# Patient Record
Sex: FEMALE | Race: WHITE | URBAN - METROPOLITAN AREA
[De-identification: names, ages, dates, MRNs, and addresses within clinical notes are randomized per-mention and may not be internally consistent; named-entity substitution may affect disease eponyms.]

---

## 2024-06-26 ENCOUNTER — APPOINTMENT (RX ONLY)
Dept: URBAN - METROPOLITAN AREA CLINIC 146 | Facility: CLINIC | Age: 74
Setting detail: DERMATOLOGY
End: 2024-06-26

## 2024-06-26 DIAGNOSIS — Z42.1 ENCOUNTER FOR BREAST RECONSTRUCTION FOLLOWING MASTECTOMY: ICD-10-CM

## 2024-06-26 PROCEDURE — 99203 OFFICE O/P NEW LOW 30 MIN: CPT

## 2024-06-26 PROCEDURE — ? COUNSELING - BREAST CANCER

## 2024-06-26 PROCEDURE — ? COUNSELING -  BREAST RECONSTRUCTION

## 2024-06-26 NOTE — PROCEDURE: COUNSELING -  BREAST RECONSTRUCTION
Count Minor/Major Decisions Toward Mdm (Not Cosmetic)?: Yes
Allergan Gel Implant Size(S) - Left: 200, 250
Detail Level: Simple

## 2024-06-26 NOTE — HPI: BREAST RECONSTRUCTION CONSULTATION
What Is Your Pre-Diagnosis Bra Size (Numeric)?: 38
Is This A New Presentation, Or A Follow-Up?: Breast Reconstruction Consultation
Who Referred You?: Dr. Vee
Additional History: She did not receive chemotherapy or radiation

## 2024-09-17 ENCOUNTER — RX ONLY (OUTPATIENT)
Age: 74
Setting detail: RX ONLY
End: 2024-09-17

## 2024-09-17 RX ORDER — ONDANSETRON 8 MG/1
TABLET, ORALLY DISINTEGRATING ORAL
Qty: 8 | Refills: 0 | Status: ERX | COMMUNITY
Start: 2024-09-17

## 2024-09-17 RX ORDER — CEPHALEXIN 500 MG/1
CAPSULE ORAL
Qty: 42 | Refills: 0 | Status: ERX | COMMUNITY
Start: 2024-09-17

## 2024-09-17 RX ORDER — OXYCODONE HYDROCHLORIDE AND ACETAMINOPHEN 5; 325 MG/1; MG/1
TABLET ORAL
Qty: 18 | Refills: 0 | Status: ERX | COMMUNITY
Start: 2024-09-17

## 2024-09-26 ENCOUNTER — APPOINTMENT (RX ONLY)
Dept: URBAN - METROPOLITAN AREA CLINIC 146 | Facility: CLINIC | Age: 74
Setting detail: DERMATOLOGY
End: 2024-09-26

## 2024-09-26 DIAGNOSIS — Z48.89 ENCOUNTER FOR OTHER SPECIFIED SURGICAL AFTERCARE: ICD-10-CM

## 2024-09-26 PROCEDURE — 99024 POSTOP FOLLOW-UP VISIT: CPT

## 2024-09-26 PROCEDURE — ? POST-OP CHECK

## 2024-09-26 PROCEDURE — ? ADDITIONAL NOTES

## 2024-09-26 ASSESSMENT — LOCATION ZONE DERM: LOCATION ZONE: TRUNK

## 2024-09-26 ASSESSMENT — LOCATION DETAILED DESCRIPTION DERM: LOCATION DETAILED: LEFT MEDIAL BREAST 6-7:00 REGION

## 2024-09-26 ASSESSMENT — LOCATION SIMPLE DESCRIPTION DERM: LOCATION SIMPLE: LEFT BREAST

## 2024-09-26 NOTE — PROCEDURE: ADDITIONAL NOTES
Additional Notes: Patient is healing well. No hematomas. She has No complaints. \\nDrain tubing is clear. Continue antibiotics. \\nFollow up in one week.
Render Risk Assessment In Note?: no

## 2024-10-03 ENCOUNTER — APPOINTMENT (RX ONLY)
Dept: URBAN - METROPOLITAN AREA CLINIC 146 | Facility: CLINIC | Age: 74
Setting detail: DERMATOLOGY
End: 2024-10-03

## 2024-10-03 DIAGNOSIS — Z48.89 ENCOUNTER FOR OTHER SPECIFIED SURGICAL AFTERCARE: ICD-10-CM

## 2024-10-03 PROCEDURE — ? ADDITIONAL NOTES

## 2024-10-03 PROCEDURE — ? DRAIN REMOVAL

## 2024-10-03 PROCEDURE — 99024 POSTOP FOLLOW-UP VISIT: CPT

## 2024-10-03 PROCEDURE — ? POST-OP CHECK

## 2024-10-03 ASSESSMENT — LOCATION ZONE DERM: LOCATION ZONE: TRUNK

## 2024-10-03 ASSESSMENT — LOCATION DETAILED DESCRIPTION DERM: LOCATION DETAILED: LEFT MEDIAL BREAST 6-7:00 REGION

## 2024-10-03 ASSESSMENT — LOCATION SIMPLE DESCRIPTION DERM: LOCATION SIMPLE: LEFT BREAST

## 2024-10-03 NOTE — PROCEDURE: ADDITIONAL NOTES
Additional Notes: Dressings removed today. One of the drains removed without difficulty. \\nFollow up in one week for post op check and remaining drain removal.
Render Risk Assessment In Note?: no

## 2024-10-08 ENCOUNTER — APPOINTMENT (RX ONLY)
Dept: URBAN - METROPOLITAN AREA CLINIC 146 | Facility: CLINIC | Age: 74
Setting detail: DERMATOLOGY
End: 2024-10-08

## 2024-10-08 DIAGNOSIS — Z48.89 ENCOUNTER FOR OTHER SPECIFIED SURGICAL AFTERCARE: ICD-10-CM

## 2024-10-08 PROCEDURE — 99024 POSTOP FOLLOW-UP VISIT: CPT

## 2024-10-08 PROCEDURE — ? DRAIN REMOVAL

## 2024-10-08 PROCEDURE — ? POST-OP CHECK

## 2024-10-08 PROCEDURE — ? ADDITIONAL NOTES

## 2024-10-08 ASSESSMENT — LOCATION SIMPLE DESCRIPTION DERM: LOCATION SIMPLE: LEFT BREAST

## 2024-10-08 ASSESSMENT — LOCATION DETAILED DESCRIPTION DERM: LOCATION DETAILED: LEFT MEDIAL BREAST 6-7:00 REGION

## 2024-10-08 ASSESSMENT — LOCATION ZONE DERM: LOCATION ZONE: TRUNK

## 2024-10-18 ENCOUNTER — APPOINTMENT (RX ONLY)
Dept: URBAN - METROPOLITAN AREA CLINIC 146 | Facility: CLINIC | Age: 74
Setting detail: DERMATOLOGY
End: 2024-10-18

## 2024-10-18 DIAGNOSIS — Z48.89 ENCOUNTER FOR OTHER SPECIFIED SURGICAL AFTERCARE: ICD-10-CM

## 2024-10-18 PROCEDURE — 99024 POSTOP FOLLOW-UP VISIT: CPT

## 2024-10-18 PROCEDURE — ? ADDITIONAL NOTES

## 2024-10-18 PROCEDURE — ? POST-OP CHECK

## 2024-10-18 ASSESSMENT — LOCATION SIMPLE DESCRIPTION DERM: LOCATION SIMPLE: LEFT BREAST

## 2024-10-18 ASSESSMENT — LOCATION DETAILED DESCRIPTION DERM: LOCATION DETAILED: LEFT MEDIAL BREAST 6-7:00 REGION

## 2024-10-18 ASSESSMENT — LOCATION ZONE DERM: LOCATION ZONE: TRUNK

## 2024-10-18 NOTE — PROCEDURE: ADDITIONAL NOTES
Render Risk Assessment In Note?: no
Additional Notes: Breasts are healing well. Advised that Breasts will soften with time \\nNext week will be one month post op. She can resume all normal activities starting then.\\nFollow up in two months

## 2024-12-18 ENCOUNTER — APPOINTMENT (OUTPATIENT)
Dept: URBAN - METROPOLITAN AREA CLINIC 146 | Facility: CLINIC | Age: 74
Setting detail: DERMATOLOGY
End: 2024-12-18

## 2024-12-18 DIAGNOSIS — Z48.89 ENCOUNTER FOR OTHER SPECIFIED SURGICAL AFTERCARE: ICD-10-CM

## 2024-12-18 PROCEDURE — ? ADDITIONAL NOTES

## 2024-12-18 PROCEDURE — 99024 POSTOP FOLLOW-UP VISIT: CPT

## 2024-12-18 PROCEDURE — ? COUNSELING - POST-OP CHECK, BREAST RECONSTRUCTION, EXPANDER/IMPLANT

## 2024-12-18 ASSESSMENT — LOCATION DETAILED DESCRIPTION DERM: LOCATION DETAILED: LEFT MEDIAL BREAST 6-7:00 REGION

## 2024-12-18 ASSESSMENT — LOCATION SIMPLE DESCRIPTION DERM: LOCATION SIMPLE: LEFT BREAST

## 2024-12-18 ASSESSMENT — LOCATION ZONE DERM: LOCATION ZONE: TRUNK

## 2024-12-18 NOTE — PROCEDURE: ADDITIONAL NOTES
Render Risk Assessment In Note?: no
Additional Notes: Left breast has slightly capsuled. Discussed possibility of releasing the capsule if she would like. \\nPhotos taken today. Recommend following up with mammograms and her oncologist. \\nFollow up prn.

## 2024-12-18 NOTE — PROCEDURE: COUNSELING - POST-OP CHECK, BREAST RECONSTRUCTION, EXPANDER/IMPLANT
Add Postop Global No-Charge Code (61054)?: yes
Count Minor/Major Decisions Toward Mdm (Not Cosmetic)?: No
Detail Level: Simple